# Patient Record
Sex: MALE | Race: BLACK OR AFRICAN AMERICAN | NOT HISPANIC OR LATINO | Employment: PART TIME | URBAN - METROPOLITAN AREA
[De-identification: names, ages, dates, MRNs, and addresses within clinical notes are randomized per-mention and may not be internally consistent; named-entity substitution may affect disease eponyms.]

---

## 2017-01-04 ENCOUNTER — HOSPITAL ENCOUNTER (INPATIENT)
Facility: HOSPITAL | Age: 37
LOS: 2 days | Discharge: HOME | DRG: 897 | End: 2017-01-06
Attending: PSYCHIATRY & NEUROLOGY | Admitting: PSYCHIATRY & NEUROLOGY
Payer: MEDICAID

## 2017-01-04 DIAGNOSIS — R45.851 DEPRESSION WITH SUICIDAL IDEATION: ICD-10-CM

## 2017-01-04 DIAGNOSIS — F10.90 ALCOHOL USE DISORDER: Primary | ICD-10-CM

## 2017-01-04 DIAGNOSIS — F32.A DEPRESSION WITH SUICIDAL IDEATION: ICD-10-CM

## 2017-01-04 DIAGNOSIS — F14.10 COCAINE ABUSE: ICD-10-CM

## 2017-01-04 PROCEDURE — 11400000 HC PSYCH PRIVATE ROOM

## 2017-01-04 PROCEDURE — 25000003 PHARM REV CODE 250: Performed by: SURGERY

## 2017-01-04 PROCEDURE — 27000339 *HC DAILY SUPPLY KIT

## 2017-01-04 RX ORDER — HALOPERIDOL 5 MG/ML
5 INJECTION INTRAMUSCULAR EVERY 6 HOURS PRN
Status: DISCONTINUED | OUTPATIENT
Start: 2017-01-04 | End: 2017-01-06 | Stop reason: HOSPADM

## 2017-01-04 RX ORDER — IBUPROFEN 200 MG
1 TABLET ORAL
Status: COMPLETED | OUTPATIENT
Start: 2017-01-04 | End: 2017-01-05

## 2017-01-04 RX ORDER — LORAZEPAM 2 MG/ML
2 INJECTION INTRAMUSCULAR EVERY 6 HOURS PRN
Status: DISCONTINUED | OUTPATIENT
Start: 2017-01-04 | End: 2017-01-06 | Stop reason: HOSPADM

## 2017-01-04 RX ORDER — HYDROXYZINE PAMOATE 50 MG/1
50 CAPSULE ORAL EVERY 6 HOURS PRN
Status: DISCONTINUED | OUTPATIENT
Start: 2017-01-04 | End: 2017-01-06 | Stop reason: HOSPADM

## 2017-01-04 RX ORDER — DIAZEPAM 10 MG/1
10 TABLET ORAL EVERY 8 HOURS
Status: DISCONTINUED | OUTPATIENT
Start: 2017-01-04 | End: 2017-01-06 | Stop reason: HOSPADM

## 2017-01-04 RX ORDER — DIPHENHYDRAMINE HYDROCHLORIDE 50 MG/ML
25 INJECTION INTRAMUSCULAR; INTRAVENOUS EVERY 6 HOURS PRN
Status: DISCONTINUED | OUTPATIENT
Start: 2017-01-04 | End: 2017-01-06 | Stop reason: HOSPADM

## 2017-01-04 RX ADMIN — NICOTINE 1 PATCH: 21 PATCH, EXTENDED RELEASE TRANSDERMAL at 06:01

## 2017-01-04 RX ADMIN — DIAZEPAM 10 MG: 10 TABLET ORAL at 09:01

## 2017-01-04 RX ADMIN — HYDROXYZINE PAMOATE 50 MG: 50 CAPSULE ORAL at 06:01

## 2017-01-04 NOTE — IP AVS SNAPSHOT
[address] [address]  Phone: [phone]           Patient Discharge Instructions     Our goal is to set you up for success. This packet includes information on your condition, medications, and your home care. It will help you to care for yourself so you don't get sicker and need to go back to the hospital.     Please ask your nurse if you have any questions.        There are many details to remember when preparing to leave the hospital. Here is what you will need to do:    1. Take your medicine. If you are prescribed medications, review your Medication List in the following pages. You may have new medications to  at the pharmacy and others that you'll need to stop taking. Review the instructions for how and when to take your medications. Talk with your doctor or nurses if you are unsure of what to do.     2. Go to your follow-up appointments. Specific follow-up information is listed in the following pages. Your may be contacted by a transition nurse or clinical provider about future appointments. Be sure we have all of the phone numbers to reach you, if needed. Please contact your provider's office if you are unable to make an appointment.     3. Watch for warning signs. Your doctor or nurse will give you detailed warning signs to watch for and when to call for assistance. These instructions may also include educational information about your condition. If you experience any of warning signs to your health, call your doctor.               [facility]  Unless otherwise directed by your provider, please contact [facility], our nurse care line that is available for 24/7 assistance.     [phone] (toll-free)    Registered nurses in the [facility] provide clinical advisement, health education, appointment booking, and other advisory services.                    ** Verify the list of medication(s) below is accurate and up to date. Carry  this with you in case of emergency. If your medications have changed, please notify your healthcare provider.             Medication List      Notice     You have not been prescribed any medications.               Please bring to all follow up appointments:    1. A copy of your discharge instructions.  2. All medicines you are currently taking in their original bottles.  3. Identification and insurance card.    Please arrive 15 minutes ahead of scheduled appointment time.    Please call 24 hours in advance if you must reschedule your appointment and/or time.        Follow-up Information     Follow up with [facility] On 1/9/2017.    Specialties:  Behavioral Health, Psychiatry, Psychology    Why:  Outpatient Psych Services, Walk in clinic at 8a    Contact information:    [address] [address]  [phone]          Follow up with [facility].    Why:  HOMELESS SHELTER, GO FOR INTAKE AS SOON AS POSSIBLE    Contact information:    [address] [facility]  [place], [place]  [phone]          Discharge Instructions       Patient educated on discharge plan with patient verbalized understanding. Patient agrees to comply to discharge plan. Patient encouraged to follow up with primary care provider. No signs of distress or complaints.  Consent obtain to fax information for follow-up visit.    Discharge References/Attachments     ANXIETY, YOUR BODY'S RESPONSE TO  (ENGLISH)        Admission Information     Date & Time Provider Department CSN    1/4/2017  5:36 PM Carlo Martinez MD [facility] [id number]       Admisson Diagnosis: Depression with suicidal ideation      Care Providers     Provider Role Specialty Primary office phone    Carlo Martinez MD Attending Provider Psychiatry [phone]      Your Vitals Were     BP Pulse Temp Resp Height Weight    118/65 (BP Location: Right arm, Patient Position: Sitting, BP Method: Automatic) 75  "96.8 °F (36 °C) (Oral) 20 6' 1" (1.854 m) 99.3 kg (219 lb)    BMI                28.89 kg/m2          Recent Lab Values     No lab values to display.      Blood Glucose and Lipid Panel Labs     No lab values to display.      Allergies as of 1/6/2017     No Known Allergies      Advance Directives     An advance directive is a document which, in the event you are no longer able to make decisions for yourself, tells your healthcare team what kind of treatment you do or do not want to receive, or who you would like to make those decisions for you.  If you do not currently have an advance directive, [facility] encourages you to create one.  For more information call:  [phone]), [phone] ([phone]),  or log on to [email or web address].        Advance Directive Status          Most Recent Value    Advance Directive Status  Patient does not have Advance Directive, declines information.      Smoking Cessation     If you would like to quit smoking:   You may be eligible for free services if you are a [place] resident and started smoking cigarettes before September 1, 1988.  Call the Smoking Cessation Trust (SCT) toll free at (130) 006-5403 or (150) 167-3776.   Call 0-066-QUIT-NOW if you do not meet the above criteria.            Language Assistance Services     ATTENTION: Language assistance services are available, free of charge. Please call [phone].      ATENCIÓN: Si habla español, tiene a olmos disposición servicios gratuitos de asistencia lingüística. Llame al [phone].     CHÚ Ý: N?u b?n nói Ti?ng Vi?t, có các d?ch v? h? tr? ngôn ng? mi?n phí dành cho b?n. G?i s? [phone].        National Suicide Prevention Lifeline     If you or someone you know is thinking about suicide, call the National Suicide Prevention Lifeline.  National Suicide Hotline: 9-248-669-TALK (4928)  The lifeline is free, confidential and always available.  Help a loved one, a friend or " yourself.  www.suicideprevenetionlifeline.org          [employer] [facility] Sign-Up     Activating your [facility] account is as easy as 1-2-3!     1) Visit [email or web address], select Sign Up Now, enter this activation code and your date of birth, then select Next.  [email or web address]  Expires: 2/19/2017  4:22 PM      2) Create a username and password to use when you visit [facility] in the future and select a security question in case you lose your password and select Next.    3) Enter your e-mail address and click Sign Up!    Additional Information  If you have questions, please e-mail [email or web address] or call [phone] to talk to our [facility] staff. Remember, [facility] is NOT to be used for urgent needs. For medical emergencies, dial 911.          [facility] complies with applicable Federal civil rights laws and does not discriminate on the basis of race, color, national origin, age, disability, or sex.

## 2017-01-04 NOTE — IP AVS SNAPSHOT
[place] [id number] [address] [address]  Phone: [phone]           I have received a copy of my After Visit Summary and discharge instructions from [facility].    INSTRUCTIONS RECEIVED AND UNDERSTOOD BY:                     Patient/Patient Representative: ________________________________________________________________     Date/Time: ________________________________________________________________                     Instructions Given By: ________________________________________________________________     Date/Time: ________________________________________________________________
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
## 2017-01-05 LAB — TSH SERPL DL<=0.005 MIU/L-ACNC: 0.56 UIU/ML

## 2017-01-05 PROCEDURE — 99223 1ST HOSP IP/OBS HIGH 75: CPT | Mod: HB,AF,S$PBB, | Performed by: PSYCHIATRY & NEUROLOGY

## 2017-01-05 PROCEDURE — 27000339 *HC DAILY SUPPLY KIT

## 2017-01-05 PROCEDURE — 84443 ASSAY THYROID STIM HORMONE: CPT

## 2017-01-05 PROCEDURE — 36415 COLL VENOUS BLD VENIPUNCTURE: CPT

## 2017-01-05 PROCEDURE — 25000003 PHARM REV CODE 250: Performed by: SURGERY

## 2017-01-05 PROCEDURE — 90833 PSYTX W PT W E/M 30 MIN: CPT | Mod: HB,AF,S$PBB, | Performed by: PSYCHIATRY & NEUROLOGY

## 2017-01-05 PROCEDURE — 99232 SBSQ HOSP IP/OBS MODERATE 35: CPT | Mod: ,,, | Performed by: NURSE PRACTITIONER

## 2017-01-05 PROCEDURE — 86592 SYPHILIS TEST NON-TREP QUAL: CPT

## 2017-01-05 PROCEDURE — 11400000 HC PSYCH PRIVATE ROOM

## 2017-01-05 RX ORDER — IBUPROFEN 200 MG
1 TABLET ORAL DAILY
Status: DISCONTINUED | OUTPATIENT
Start: 2017-01-05 | End: 2017-01-06 | Stop reason: HOSPADM

## 2017-01-05 RX ADMIN — DIAZEPAM 10 MG: 10 TABLET ORAL at 09:01

## 2017-01-05 RX ADMIN — DIAZEPAM 10 MG: 10 TABLET ORAL at 01:01

## 2017-01-05 RX ADMIN — DIAZEPAM 10 MG: 10 TABLET ORAL at 05:01

## 2017-01-05 RX ADMIN — HYDROXYZINE PAMOATE 50 MG: 50 CAPSULE ORAL at 08:01
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
## 2017-01-05 NOTE — PROGRESS NOTES
"Therapeutic Recreation Assessment Summary: Met with the patient to assess and develop a plan of care. Patient was cooperative with the evaluation process. Patient presents with a pleasant affect and relaxed mood. Patient states his admit is due to "Depression. I was drinking and someone thought I was going to hurt myself." Patient verbalized enjoyment from reading and listening to music. Patient admits to negative leisure lifestyle of drinking alcohol, cocaine use and smoking cigarettes. Patient identifies leisure barriers due to a lack of finances and unstable lucas g arrangements. Patient has an 11th grade education and lives in the [place](lived with his mom before admit. Patient upset his mom put him out). Patient has an 11th grade education. Patient states "I just need another job and a place to live." Patient verbalizes willingness to participate and become active in his treatment.   "
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
## 2017-01-05 NOTE — PSYCH
"    Chief Compliant:  Patient pleasant but guarded. States he is looking forward to discharge, has to get back to work.   Pt denies asthma or health issues, but breathes heavily. Patient states he was celebrating the holidays, drank and used cocaine. Patient minimizes substance use. Pt reports no problems sleeping.   Pt notes he was stressed because his mother was putting him out on December 31. Patient refused to sign release for mother. Pt has been living with her "for some months" states he was trying to save money to get his on place again, but didn't have enough. 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    Patient denies SI. Pt concerned if I had spoken to his girlfriend. PT not convinced they have broken up.   Patient requesting work excuse. Suggested referral to [facility]. PT states he is interested in getting his GED. Pt states he has a brother and sister, but is not in contact with them much. Suggested Patient talk with his mother. He agreed.     Pt Age/Gender/Appearance/Psych History/Symptoms and Duration:  Patient is a 35 yo male admitted with depression and SI.     Suicidal Ideations/Plan/Attempt History/Risk and Protective Factors:  Patient denies.     Substance Abuse History/UTOX:  Patient minimizes substance use.     Sleep/Appetite Quality:  Patient reports no problems     Compliance/Legal History/Issues:  None     Abuse Concerns:  None     Cultural/Buddhism Values/Beliefs:  Spiritism     Supports/Marital Status/Quality of Interpersonal Relationships:  Mother, girlfriend     Initial Discharge Plan:  D/C to [facility] with [facility]      "

## 2017-01-05 NOTE — PSYCH
Spoke to Dr. Martinez on phone, informed pt BP via monitor 90/54, and 90/60 manually, pt is asymptomatic. No new orders obtained. Will continue to monitor.

## 2017-01-05 NOTE — PLAN OF CARE
Problem: Patient Care Overview (Adult)  Goal: Plan of Care Review  Outcome: Ongoing (interventions implemented as appropriate)  Asking if his two cell phones and wallet were on his property list . He is eating all meals and is in the dayroom with staff and peers . Cooperative, quiet
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
## 2017-01-05 NOTE — PLAN OF CARE
Problem: Overarching Goals (Adult)  Goal: Develops/Participates in Therapeutic [place] to Support Successful Transition  Outcome: Ongoing (interventions implemented as appropriate)  Group Note     Behavior:  Patient attended Psychotherapy Group and participated. Pt presents with pleasant mood and affect.      Intervention:  If You Can.  Patients discussed the phrase 'You can if you think you can.' Counselor processed with patients their beliefs about achievements in their lives, learning from failures, setting goals and decision making.      Response:  Patient was at first to slow to understand the statement, but eventually did. Pt states he would change it to be not think but Know he can. 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 Pt states having fresh goals and being able to go on to something else - having a plan B helps him.  Patient minimizes the circumstances for his hospitalization. Pt states he was upset that his mom was putting him out and that did lead to him trying to numb his pain. Pt still sees it as minor and states it has been resolved. .      Plan:  Will continue to encourage Patient participation in group. Will meet 1:1 with Patient.

## 2017-01-05 NOTE — SUBJECTIVE & OBJECTIVE
History reviewed. No pertinent past medical history.    No past surgical history on file.    Review of patient's allergies indicates:  No Known Allergies    No current facility-administered medications on file prior to encounter.      No current outpatient prescriptions on file prior to encounter.     Family History     None        Social History Main Topics    Smoking status: Current Every Day Smoker     Packs/day: 0.25    Smokeless tobacco: Not on file      Comment: fixated on smoking    Alcohol use Yes      Comment: varies    Drug use: Yes     Special: Cocaine      Comment: pt not forthcoming on how often he does it    Sexual activity: Yes     Partners: Female     Review of Systems   Constitutional: Negative for chills, diaphoresis, fatigue and fever.   Eyes: Negative for visual disturbance.   Respiratory: Negative for cough, shortness of breath and wheezing.    Cardiovascular: Negative for chest pain.   Gastrointestinal: Negative for abdominal distention, abdominal pain, constipation, diarrhea, nausea and vomiting.   Musculoskeletal: Negative for arthralgias, back pain and myalgias.   Neurological: Negative for headaches.   Psychiatric/Behavioral: Negative for agitation, dysphoric mood, sleep disturbance and suicidal ideas. The patient is not nervous/anxious.      Objective:     Vital Signs (Most Recent):  Temp: 96 °F (35.6 °C) (01/05/17 0742)  Pulse: 91 (01/05/17 0742)  Resp: 18 (01/05/17 0742)  BP: 106/70 (01/05/17 0742) Vital Signs (24h Range):  Temp:  [96 °F (35.6 °C)-96.8 °F (36 °C)] 96 °F (35.6 °C)  Pulse:  [77-91] 91  Resp:  [16-20] 18  BP: ()/(54-80) 106/70     Weight: 99.3 kg (219 lb)  Body mass index is 28.89 kg/(m^2).    Physical Exam   Constitutional: He is oriented to person, place, and time. He appears well-developed and well-nourished.   HENT:   Head: Normocephalic and atraumatic.   Cardiovascular: Normal rate, regular rhythm and normal heart sounds.    Pulmonary/Chest: Effort normal  and breath sounds normal.   Abdominal: Soft. Bowel sounds are normal.   Musculoskeletal: He exhibits no edema.   Neurological: He is alert and oriented to person, place, and time. No cranial nerve deficit.   Neuro: Cranial nerves:  CN II Visual fields full to confrontation.   CN III, IV, VI Pupils are equal, round, and reactive to light.  CN III: no palsy  Nystagmus: none   Diplopia: none  Ophthalmoparesis: none  CN V Facial sensation intact.    CN VII Facial expression full, symmetric.    CN VIII normal.    CN IX normal.   CN X normal.   CN XI normal.   CN XII normal.       Skin: Skin is warm and dry. No pallor.   Psychiatric: He has a normal mood and affect. His behavior is normal. Judgment and thought content normal.   Nursing note and vitals reviewed.      Significant Labs:     Reviewed labs, all wnl.     Significant Imaging: none

## 2017-01-05 NOTE — CONSULTS
"[facility]  Consult Note    Patient Name: Abraham Pedersen  MRN: [id number]  Admission Date: 1/4/2017  Hospital Length of Stay: 1 days  Code Status: Full Code  Attending Physician: Carlo Martinez MD   Primary Care Provider: Primary Doctor No   Principal Problem: <principal problem not specified>    Consults          Patient information was obtained from patient and ER records.     Subjective:     Chief Complaint:  depressed     HPI: The patient presented to the ER on 1/4/17 with complaints of depression and SI. He arrived via EMS to the ER on 1/1/17 with complaints of SI for the last 2 months after "losing everything." This occurred in the context of heavy alcohol and cocaine use. He was agitate din the ER and required prn medications on 1/1 and 1/3/2017. The patient was medically cleared and admitted to the [facility].     History reviewed. No pertinent past medical history.    No past surgical history on file.    Review of patient's allergies indicates:  No Known Allergies    No current facility-administered medications on file prior to encounter.      No current outpatient prescriptions on file prior to encounter.     Family History     None        Social History Main Topics    Smoking status: Current Every Day Smoker     Packs/day: 0.25    Smokeless tobacco: Not on file      Comment: fixated on smoking    Alcohol use Yes      Comment: varies    Drug use: Yes     Special: Cocaine      Comment: pt not forthcoming on how often he does it    Sexual activity: Yes     Partners: Female     Review of Systems   Constitutional: Negative for chills, diaphoresis, fatigue and fever.   Eyes: Negative for visual disturbance.   Respiratory: Negative for cough, shortness of breath and wheezing.    Cardiovascular: Negative for chest pain.   Gastrointestinal: Negative for abdominal distention, abdominal pain, constipation, diarrhea, nausea and vomiting.   Musculoskeletal: Negative for arthralgias, back pain and " myalgias.   Neurological: Negative for headaches.   Psychiatric/Behavioral: Negative for agitation, dysphoric mood, sleep disturbance and suicidal ideas. The patient is not nervous/anxious.      Objective:     Vital Signs (Most Recent):  Temp: 96 °F (35.6 °C) (01/05/17 0742)  Pulse: 91 (01/05/17 0742)  Resp: 18 (01/05/17 0742)  BP: 106/70 (01/05/17 0742) Vital Signs (24h Range):  Temp:  [96 °F (35.6 °C)-96.8 °F (36 °C)] 96 °F (35.6 °C)  Pulse:  [77-91] 91  Resp:  [16-20] 18  BP: ()/(54-80) 106/70     Weight: 99.3 kg (219 lb)  Body mass index is 28.89 kg/(m^2).    Physical Exam   Constitutional: He is oriented to person, place, and time. He appears well-developed and well-nourished.   HENT:   Head: Normocephalic and atraumatic.   Cardiovascular: Normal rate, regular rhythm and normal heart sounds.    Pulmonary/Chest: Effort normal and breath sounds normal.   Abdominal: Soft. Bowel sounds are normal.   Musculoskeletal: He exhibits no edema.   Neurological: He is alert and oriented to person, place, and time. No cranial nerve deficit.   Neuro: Cranial nerves:  CN II Visual fields full to confrontation.   CN III, IV, VI Pupils are equal, round, and reactive to light.  CN III: no palsy  Nystagmus: none   Diplopia: none  Ophthalmoparesis: none  CN V Facial sensation intact.    CN VII Facial expression full, symmetric.    CN VIII normal.    CN IX normal.   CN X normal.   CN XI normal.   CN XII normal.       Skin: Skin is warm and dry. No pallor.   Psychiatric: He has a normal mood and affect. His behavior is normal. Judgment and thought content normal.   Nursing note and vitals reviewed.      Significant Labs:     Reviewed labs, all wnl.     Significant Imaging: none    Assessment/Plan:     Depression with suicidal ideation  Medically stable.       VTE Risk Mitigation     None        Thank you for your consult. I will sign off. Please contact us if you have any additional questions.    Octavia Barriga,  NP  Department of Hospital Medicine   [facility]

## 2017-01-05 NOTE — PSYCH
"Contacted Patient's girlfriend Abdiel ([phone]). When asked if patient will reside with her upon discharge, there was a long pause. She states she told patient she had to ask her mom about him staying with her, "she helps with my rent."  GF states Patient was drinking New Years vineet, "and you know what happens when you drink.  He was drinking beer. I've known him to drink, not a lot really, he has a job and everything."   She states he was "dru mad and beligerant.  GF then asked if we had talked with his mother, "That's who he lives with."  She went to state, "I don't really want to see him anymore. that could be why he got mad; She states for the last month "he's known that we ain't going to be seeing each other. He's really private with his stuff. I dont think he'd hurt himself. He's a really good missy. He might have an issue with drinking."  She is concerned that not having somewhere togo will keep him from leaving the hospital. "Could he stay in a shelter? He really needs to get back to work." She states they will stay friends.   Patient signed release for girlfriend but hesitated to sign release for mother. Will ask him again.   "

## 2017-01-06 VITALS
WEIGHT: 219 LBS | BODY MASS INDEX: 29.03 KG/M2 | RESPIRATION RATE: 20 BRPM | HEART RATE: 75 BPM | HEIGHT: 73 IN | SYSTOLIC BLOOD PRESSURE: 118 MMHG | DIASTOLIC BLOOD PRESSURE: 65 MMHG | TEMPERATURE: 97 F

## 2017-01-06 PROBLEM — F10.90 ALCOHOL USE DISORDER: Status: ACTIVE | Noted: 2017-01-06

## 2017-01-06 PROBLEM — F14.10 COCAINE ABUSE: Status: ACTIVE | Noted: 2017-01-06

## 2017-01-06 PROBLEM — F32.A DEPRESSION WITH SUICIDAL IDEATION: Status: RESOLVED | Noted: 2017-01-04 | Resolved: 2017-01-06

## 2017-01-06 PROBLEM — R45.851 DEPRESSION WITH SUICIDAL IDEATION: Status: RESOLVED | Noted: 2017-01-04 | Resolved: 2017-01-06

## 2017-01-06 LAB — RPR SER QL: NORMAL

## 2017-01-06 PROCEDURE — 25000003 PHARM REV CODE 250: Performed by: SURGERY

## 2017-01-06 PROCEDURE — 99238 HOSP IP/OBS DSCHRG MGMT 30/<: CPT | Mod: HB,AF,S$PBB, | Performed by: PSYCHIATRY & NEUROLOGY

## 2017-01-06 RX ADMIN — DIAZEPAM 10 MG: 10 TABLET ORAL at 01:01

## 2017-01-06 RX ADMIN — DIAZEPAM 10 MG: 10 TABLET ORAL at 06:01

## 2017-01-06 NOTE — DISCHARGE INSTRUCTIONS
Patient educated on discharge plan with patient verbalized understanding. Patient agrees to comply to discharge plan. Patient encouraged to follow up with primary care provider. No signs of distress or complaints.  Consent obtain to fax information for follow-up visit.

## 2017-01-06 NOTE — PSYCH
Patient will be following up with [facility] at [address] [place] [address] In [place], [phone].  Patient will also  Have tobacco cessation counseling at this facility.  This is a walk in facility and patient has been instructed to attend on 1/9/17 at 8 am.
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
## 2017-01-06 NOTE — PLAN OF CARE
Problem: Patient Care Overview (Adult)  Goal: Plan of Care Review  Outcome: Ongoing (interventions implemented as appropriate)  Pt has slept throughout the night without difficulty.  NAD.  Resp even & unlabored.  Pathways clear and bed is low.  Q 15 minute safety checks ongoing.  All precautions maintained.

## 2017-01-06 NOTE — PSYCH
Patient is medically cleared for discharge. Patient compliant and cooperative with discharge education. No suicidal/homicidal thoughts or negative emotions at this time. Pain score 0/10. No concerns or questions. Awaiting Henrry's transportation to take patient to [facility] in [place].

## 2017-01-06 NOTE — PLAN OF CARE
Problem: Patient Care Overview (Adult)  Goal: Plan of Care Review  Outcome: Ongoing (interventions implemented as appropriate)  Visible in the milieu.  Cooperative. Restless at times.  Compliant with unit rules and meds. Participating in unit activities.  Interacting appropriately with peers and staff.  No s/s detox.  VS stable.  Pt does not desire rehab for drug use so discharge will be forthcoming.  All precautions maintained.

## 2017-01-10 ENCOUNTER — PATIENT OUTREACH (OUTPATIENT)
Dept: ADMINISTRATIVE | Facility: CLINIC | Age: 37
End: 2017-01-10
Payer: MEDICAID

## 2017-01-10 NOTE — PATIENT INSTRUCTIONS
Drug Abuse  Use and abuse of drugs like marijuana, amphetamines (speed, crank), cocaine, heroin or prescription pain medicines, sedatives and sleeping pills, MDMA, ecstasy, bath salts, PCP, mescaline and LSD may lead to addiction or dependence. Once this happens, you are at greater risk for any of the following:  Social and personal problems  · Craving for the drug and not able to stop using even though you think you want to stop (psychological addiction)  · Drug withdrawal symptoms if you stop taking the drug (physical dependence)  · Loss of job or your family  · Arrest, conviction, and [facility] sentence for possession of an illegal substance or for driving under the influence  Health problems  · Strokes, heart attacks, kidney failure  · Accidental injuries to yourself or others while you are under the influence of the drug (in a car or at home)  · HIV infection (much greater risk if you use IV drugs)  · Skin infections  · Other STDs (herpes, chlamydia, gonorrhea, and others)  · Severe and fatal infection of the heart valves (if you use IV drugs)  · Hepatitis B or C  · Death from overdose  Home care  The following suggestions can help you care for yourself at home:  · Admit you have a drug problem. Ask for help from your family and close friends.  · Seek professional help. This could be in the form of individual psychotherapy or counseling. There are also outpatient, inpatient, and residential drug treatment programs.  · Join a self-help group for drug abuse.  · Stay away from friends who abuse drugs or tempt you to continue abusing drugs.  · Eat a balanced diet and start a regular exercise program.  Follow-up care  Follow up with your doctor or as advised by our staff. Contact one of the resources below for help:  · National [place] on Alcoholism and Drug Dependence  www.ncadd.org  534-426-QIMP  · Narcotics Anonymous  www.na.org  821.186.9074  · National Alcohol and Substance Abuse Information Center (for referral  to treatment programs)  www.[employer]  303.419.6239  Call 911  Call 911 if any of the following occur:  · Seizure  · Hard time breathing or slow, irregular breathing  · Chest pain  · Sudden weakness on one side of your body or sudden trouble speaking  · Very drowsy or trouble awakening  · Fainting or loss of consciousness  · Rapid heart rate  · Very slow heart rate  When to seek medical care  Get prompt medical attention if any of the following occur:  · Agitation, anxiety, unable to sleep  · Unintended weight loss (more than 10 to 15 pounds over 3 months)  · Fever of 100.4°F (38°C) or higher, or as directed by your health care provider  · Shortness of breath  · Cough with colored sputum  · Redness, swelling, or tenderness at an injection site  © 7989-6252 [employer]. [address] [place] [address], [place] [address]. All rights reserved. This information is not intended as a substitute for professional medical care. Always follow your healthcare professional's instructions.